# Patient Record
(demographics unavailable — no encounter records)

---

## 2024-10-07 NOTE — PHYSICAL EXAM
[General Appearance - Alert] : alert [General Appearance - In No Acute Distress] : in no acute distress [] : no respiratory distress [Exaggerated Use Of Accessory Muscles For Inspiration] : no accessory muscle use [Heart Rate And Rhythm] : heart rate and rhythm were normal [Arterial Pulses Normal] : the arterial pulses were normal [Abdomen Soft] : soft [Nondistended] : nondistended [Normal] : normal skin color and pigmentation and no rash [No Focal Deficits] : no focal deficits [Oriented To Time, Place, And Person] : oriented to person, place, and time

## 2024-10-07 NOTE — PHYSICAL EXAM
[General Appearance - Alert] : alert [General Appearance - In No Acute Distress] : in no acute distress [] : no respiratory distress [Heart Rate And Rhythm] : heart rate and rhythm were normal [Exaggerated Use Of Accessory Muscles For Inspiration] : no accessory muscle use [Arterial Pulses Normal] : the arterial pulses were normal [Abdomen Soft] : soft [Nondistended] : nondistended [Normal] : normal skin color and pigmentation and no rash [No Focal Deficits] : no focal deficits [Oriented To Time, Place, And Person] : oriented to person, place, and time

## 2024-10-10 NOTE — ADDENDUM
[FreeTextEntry1] : Pt reports dysuria. tried to get UA but pt could not give a sample. He had urinary incontinence instead. Will send script for Cipro presuming UTI

## 2024-10-10 NOTE — ASSESSMENT
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 He started Erbitux on July 5th 2024 He started RT August 2024- 5 frax completed on 8/26/24  Presented with symptomatic hypercalcemia. This had resolved with IV hydration, cancer tx and Zometa will check again today to see if IVF and Zometa need to be reinitiated pt is much more alert and denies any pain.  F/U with rad onc Pt continues on G tube feeds, post placement of PEG on 8/12/24 with Dr. Jung. Pt has been able to take some PO now. Continue working with Speech and Swallow  Cancer related pain: improved and continue meds as needed Other symptoms have resolved PET/CT 3 months after tx- ordered by Dr. Villalpando and to be done mid-November rt parotid swelling- could be post-RT changes. will follow up closely OV in 2 months

## 2024-10-10 NOTE — REVIEW OF SYSTEMS
[Fever] : no fever [Chills] : no chills [Night Sweats] : no night sweats [Fatigue] : fatigue [Recent Change In Weight] : ~T recent weight change [Eye Pain] : no eye pain [Vision Problems] : no vision problems [Dysphagia] : no dysphagia [Loss of Hearing] : no loss of hearing [Nosebleeds] : no nosebleeds [Hoarseness] : no hoarseness [Mucosal Pain] : mucosal pain [Shortness Of Breath] : no shortness of breath [Wheezing] : no wheezing [Cough] : no cough [SOB on Exertion] : shortness of breath during exertion [Abdominal Pain] : no abdominal pain [Vomiting] : no vomiting [Constipation] : constipation [Joint Pain] : no joint pain [Joint Stiffness] : no joint stiffness [Muscle Pain] : no muscle pain [Muscle Weakness] : muscle weakness [Confused] : no confusion [Dizziness] : no dizziness [Fainting] : no fainting [Difficulty Walking] : no difficulty walking [Suicidal] : not suicidal [Depression] : no depression [Negative] : Heme/Lymph [FreeTextEntry4] : thrush resolved, tongue swelling, [FreeTextEntry7] : PEG tube insertion site clean/dressing without leakage

## 2024-10-10 NOTE — HISTORY OF PRESENT ILLNESS
[de-identified] : Mr. Hussein presents today for consideration for systemic tx  Patient noted to have tongue swelling for a few months associated with pain, trouble swallowing, weight loss, left sided otalgia, needle like pain in neck, and mumbled voice starting in November 2023. Pt saw a dentist who was concerned about tongue cancer and was referred to Dr. Rodríguez  Pathology from 6/5/2024 FNA showed: LYMPH NODE, NECK, SUBMENTAL, FNA  Final Diagnosis LYMPH NODE, NECK, SUBMENTAL, FNA NON DIAGNOSTIC.  Cytology slide shows scant blood and few degenerated cells, insufficient for diagnosis.  Cell block is noncontributory.   PET scan 6/15/2024 showed 1. Intensely hypermetabolic lesion involving much of the tongue, consistent with malignant involvement. Bilateral necrotic neck masses and lymph nodes consistent with metastatic lymphadenopathy. 2. RIGHT LOWER lobe pleural-based pulmonary nodule with little to no uptake. Further evaluation suggested to exclude a separate pathology. 3. Physiologic uptake at RIGHT temporalis muscle. 4. Suspected adrenal hyperplasia and/or functioning adenomas. 5. Rest in body above.  Repeat Bx: scheduled for Thursday  Saw rad onc today. Dr. Villalpando  Pt is not surgical candidate due to age. Per Dr. Villalpando, not a candidate for conventional definitive RT he is here for discussion regarding systemic tx.  7/19/24: Patient seen today for follow up prior to his scheduled erbitux accompanied by his daughter. He has completed 2 weeks of erbitux thus far. He was supposed to have started RT however he has not because he needs all of his teeth removed. He plans to have teeth removed next week and then will start RT two weeks after that. He reports nausea after starting treatment, not currently using any antiemetics. He continues to use tramadol for pain. Of note, he has lost 27 pounds since his initial visit. He and his daughter report that his wife purees food for him to drink orally and per the patient he is doing this, however this is questionable per the daughter. Patient does not like to drink ensure. He denies oral pain or difficulty swallowing or decreased appetite. He was also experiencing constipation which was relieved after taking milk or magnesia. Denies fever, chills, SOB, rash.  7/26/24: Patient seen today for follow up accompanied by his daughter and grand daughter and his son joined the visit by telephone. THey report that he has been eating more and is now eating about 3 meals today and is drinking about 2 ensures per day. He had his tooth removed on Wednesday. He denies N/V/D, rash.  8/2/24: Patient seen today for follow up accompanied by his daughter and his son joined the visit via telephone. They report that his tongue has become more swollen over the past week. He has not been eating or drinking as much and as per patient, this is because he did not know that he was supposed to be eating/drinking more than 1 ensure per day. He reports dry mouth at night and when waking up whcih is relieved with water. Has some pain on his tongue from occasionally biting his tongue given how large it is now. He sometimes feels SOB when he first wakes up but this improves after drinking water  8/9/24: Patient seen today for follow up while receiving erbitux, accompanied by his daughter. He started RT yesterday and subsequently experienced increased oral pain. He is planned to have PEG placement with Dr. Jung next week.  8/23/24: Pt seen at the treatment room. His son reports that pt had PEG tube placement on 8/12/24 with Dr. Jung. Thereafter he started tube feeding without significant issues, and he was followed by nutrition team for supplies of tube feeds. He is on home care for management of PEG once/week. Son reports that pt drinks water from mouth but no oral intake for food. He has constipation but controlled with bowel regimen medications. He has improved local pain control around neck region. He is on RT therapy (will finish 5 frax on Monday 8/26/24). The swelling of tongue has been improved with mouth washes. Denies fever/chills, chest pain, SOB at rest, cough, voice changes. He is able to walk a bit more at home comparing to several weeks ago. Admits burning with urination but denies hematuria, increased frequency and flank/abdominal pain.   9/4/24: Patient seen today for follow up accompanied by his son. He completed radiation and erbitux end of august. He is completely PEG dependent at this time and only swallows MMW. He is not speaking much. He reports that his pain is controlled with MMW and tramadol. His constipation improved once he started the tube feeds. Per patients son, they never started the cipro prescribed to them at the last visit because they felt the patients urinary symptoms were due to him being off of flomax. He has since restarted flomax but today reports ongoing urinary frequency and endorses some pain with urination as well. Denies F/C, N/V/D, rash   10/7/24: Pt presents for follow up. He is much better. However, still has tongue swelling which is affecting speech and swallowing. He continues to feed via PEG tube with difficulty in swallowing. He is, however, much more active, and has been engaging in family events and activities.    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

## 2024-10-10 NOTE — PHYSICAL EXAM
[Ambulatory and capable of all self care but unable to carry out any work activities] : Status 2- Ambulatory and capable of all self care but unable to carry out any work activities. Up and about more than 50% of waking hours [Thin] : thin [Ulcers] : no ulcers [Mucositis] : no mucositis [Thrush] : no thrush [Vesicles] : no vesicles [Normal] : grossly intact [de-identified] : MICHAEL  [de-identified] : glossomegaly, no obvious mass lesions,  [de-identified] : PEG tube insertion site clean/dressing without leakage

## 2024-10-10 NOTE — REASON FOR VISIT
[Follow-Up Visit] : a follow-up [Family Member] : family member [FreeTextEntry2] : Head and neck cancer.

## 2024-10-14 NOTE — HISTORY OF PRESENT ILLNESS
[FreeTextEntry1] : Mr. Hussein presents today for consideration for radiation therapy.   Patient noted to have tongue swelling for a months with pain, trouble swallowing, wait loss, left sided otalgia, needle like pain in neck, and mumbled voice starting in november.   Pathology from 6/5/2024 FNA showed: LYMPH NODE, NECK, SUBMENTAL, FNA  Final Diagnosis LYMPH NODE, NECK, SUBMENTAL, FNA NON DIAGNOSTIC.  Cytology slide shows scant blood and few degenerated cells, insufficient for diagnosis.  Cell block is noncontributory.  PET scan 6/15/2024 showed  1.  Intensely hypermetabolic lesion involving much of the tongue, consistent with malignant involvement. Bilateral necrotic neck masses and lymph nodes consistent with metastatic lymphadenopathy. 2.  RIGHT LOWER lobe pleural-based pulmonary nodule with little to no uptake. Further evaluation suggested to exclude a separate pathology. 3.  Physiologic uptake at RIGHT temporalis muscle. 4.  Suspected adrenal hyperplasia and/or functioning adenomas. 5.  Rest in body above.  8/30/2024 Completed SBRT to oral cavity total dose of 4000cGy in 5 fractions.   Presents today for follow up. He continues to feed via PEG tube with difficulty in swallowing.

## 2024-10-14 NOTE — REVIEW OF SYSTEMS
[Dysphagia: Grade 2 - Symptomatic and altered eating/swallowing] : Dysphagia: Grade 2 - Symptomatic and altered eating/swallowing [Fatigue: Grade 1 - Fatigue relieved by rest] : Fatigue: Grade 1 - Fatigue relieved by rest [Mucositis Oral: Grade 0] : Mucositis Oral: Grade 0  [Xerostomia: Grade 1 - Symptomatic (e.g., dry or thick saliva) without significant dietary alteration; unstimulated saliva flow >0.2 ml/min] : Xerostomia: Grade 1 - Symptomatic (e.g., dry or thick saliva) without significant dietary alteration; unstimulated saliva flow >0.2 ml/min [Oral Pain: Grade 0] : Oral Pain: Grade 0 [Skin Hyperpigmentation: Grade 1 - Hyperpigmentation covering <10% BSA; no psychosocial impact] : Skin Hyperpigmentation: Grade 1 - Hyperpigmentation covering <10% BSA; no psychosocial impact [Dermatitis Radiation: Grade 0] : Dermatitis Radiation: Grade 0 [Dysphagia] : dysphagia [Odynophagia] : odynophagia [Negative] : Allergic/Immunologic [Loss of Hearing] : no loss of hearing [Nosebleeds] : no nosebleeds [Hoarseness] : no hoarseness [Mucosal Pain] : no mucosal pain [FreeTextEntry6] : PEG [de-identified] : PEG

## 2024-10-14 NOTE — REVIEW OF SYSTEMS
[Dysphagia: Grade 2 - Symptomatic and altered eating/swallowing] : Dysphagia: Grade 2 - Symptomatic and altered eating/swallowing [Fatigue: Grade 1 - Fatigue relieved by rest] : Fatigue: Grade 1 - Fatigue relieved by rest [Mucositis Oral: Grade 0] : Mucositis Oral: Grade 0  [Xerostomia: Grade 1 - Symptomatic (e.g., dry or thick saliva) without significant dietary alteration; unstimulated saliva flow >0.2 ml/min] : Xerostomia: Grade 1 - Symptomatic (e.g., dry or thick saliva) without significant dietary alteration; unstimulated saliva flow >0.2 ml/min [Oral Pain: Grade 0] : Oral Pain: Grade 0 [Skin Hyperpigmentation: Grade 1 - Hyperpigmentation covering <10% BSA; no psychosocial impact] : Skin Hyperpigmentation: Grade 1 - Hyperpigmentation covering <10% BSA; no psychosocial impact [Dermatitis Radiation: Grade 0] : Dermatitis Radiation: Grade 0 [Dysphagia] : dysphagia [Odynophagia] : odynophagia [Negative] : Allergic/Immunologic [Loss of Hearing] : no loss of hearing [Nosebleeds] : no nosebleeds [Hoarseness] : no hoarseness [Mucosal Pain] : no mucosal pain [FreeTextEntry6] : PEG [de-identified] : PEG

## 2024-10-14 NOTE — REVIEW OF SYSTEMS
[Dysphagia: Grade 2 - Symptomatic and altered eating/swallowing] : Dysphagia: Grade 2 - Symptomatic and altered eating/swallowing [Fatigue: Grade 1 - Fatigue relieved by rest] : Fatigue: Grade 1 - Fatigue relieved by rest [Mucositis Oral: Grade 0] : Mucositis Oral: Grade 0  [Xerostomia: Grade 1 - Symptomatic (e.g., dry or thick saliva) without significant dietary alteration; unstimulated saliva flow >0.2 ml/min] : Xerostomia: Grade 1 - Symptomatic (e.g., dry or thick saliva) without significant dietary alteration; unstimulated saliva flow >0.2 ml/min [Oral Pain: Grade 0] : Oral Pain: Grade 0 [Skin Hyperpigmentation: Grade 1 - Hyperpigmentation covering <10% BSA; no psychosocial impact] : Skin Hyperpigmentation: Grade 1 - Hyperpigmentation covering <10% BSA; no psychosocial impact [Dermatitis Radiation: Grade 0] : Dermatitis Radiation: Grade 0 [Dysphagia] : dysphagia [Odynophagia] : odynophagia [Negative] : Allergic/Immunologic [Loss of Hearing] : no loss of hearing [Nosebleeds] : no nosebleeds [Hoarseness] : no hoarseness [Mucosal Pain] : no mucosal pain [FreeTextEntry6] : PEG [de-identified] : PEG

## 2024-10-14 NOTE — REVIEW OF SYSTEMS
[Dysphagia: Grade 2 - Symptomatic and altered eating/swallowing] : Dysphagia: Grade 2 - Symptomatic and altered eating/swallowing [Fatigue: Grade 1 - Fatigue relieved by rest] : Fatigue: Grade 1 - Fatigue relieved by rest [Mucositis Oral: Grade 0] : Mucositis Oral: Grade 0  [Xerostomia: Grade 1 - Symptomatic (e.g., dry or thick saliva) without significant dietary alteration; unstimulated saliva flow >0.2 ml/min] : Xerostomia: Grade 1 - Symptomatic (e.g., dry or thick saliva) without significant dietary alteration; unstimulated saliva flow >0.2 ml/min [Oral Pain: Grade 0] : Oral Pain: Grade 0 [Skin Hyperpigmentation: Grade 1 - Hyperpigmentation covering <10% BSA; no psychosocial impact] : Skin Hyperpigmentation: Grade 1 - Hyperpigmentation covering <10% BSA; no psychosocial impact [Dermatitis Radiation: Grade 0] : Dermatitis Radiation: Grade 0 [Dysphagia] : dysphagia [Odynophagia] : odynophagia [Negative] : Allergic/Immunologic [Loss of Hearing] : no loss of hearing [Nosebleeds] : no nosebleeds [Hoarseness] : no hoarseness [Mucosal Pain] : no mucosal pain [FreeTextEntry6] : PEG [de-identified] : PEG

## 2024-10-31 NOTE — ADDENDUM
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   - Chief Complaint (CC): Follow-up for squamous cell carcinoma of the left basal tongue after completing chemoradiotherapy.   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 He is under the care of Dr. Smith (medical oncologist) and Dr. Villalpando (radiation oncologist). The patient completed radiation and chemo therapies end of August. He experienced significant weight loss during the treatment, dropping from 210 pounds to around 150 pounds, but his weight has since stabilized. The patient is currently unable to consume solid foods and has a gastrostomy tube in place, which was inserted before the third chemotherapy session. He can tolerate liquids orally but relies on the feeding tube for nutritional support. The patient is currently receiving speech therapy to address swallowing difficulties and is being monitored by the nutritionist.   - Past Medical History:     - Squamous cell carcinoma of the left basal tongue (T4 N2 stage)     - Significant weight loss during cancer treatment     - Gastrostomy tube placement   - Past Surgical History:     - Gastrostomy tube placement    Objective:   - Diagnostic Results: A PET scan has been ordered for November 19th (or November 23rd, as per the patient's recollection) to assess the response to treatment and determine if any residual cancer is present.   - Physical Examination (PE): On physical examination, the patient appears to be in good condition for his age and the extensive treatment he has undergone. No palpable lymph nodes were noted in the neck region. Intraoral examination revealed some edema on the side of the tongue, but no visible tumor mass or radiation mucositis was observed. A small area of candidiasis was noted on the side of the mouth. The tongue felt soft, and no fibrosis was evident.   Assessment and Plan:   - Squamous Cell Carcinoma of the Left Basal Tongue (T4 N2): Based on the clinical examination and the patient's history, it appears that the chemoradiotherapy has been effective in treating the squamous cell carcinoma of the left basal tongue. However, the upcoming PET scan will provide a more definitive assessment of the treatment response and determine if any residual cancer is present.     - Therapeutic Interventions: Continue with speech therapy and nutritional support via the gastrostomy tube. Initiate appropriate antifungal treatment for the oral candidiasis.      - Diagnostic Tests: Proceed with the scheduled PET scan on November 19th to evaluate the treatment response and assess for any residual disease.      - Rx: nystatin mouthwash      - Follow-Up: Schedule a follow-up appointment approximately 6 weeks after the PET scan to review the results, perform a nasopharyngoscopy, and determine the next steps in the patient's care.

## 2024-12-04 NOTE — ADDENDUM
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   - Chief Complaint (CC): Follow-up for squamous cell carcinoma of the left basal tongue after completing chemoradiotherapy. 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 He is under the care of Dr. Smith (medical oncologist) and Dr. Villalpando (radiation oncologist). The patient completed radiation and chemo therapies end of August. He experienced significant weight loss during the treatment, dropping from 210 pounds to around 150 pounds, but his weight has since stabilized. The patient is currently unable to consume solid foods and has a gastrostomy tube in place, which was inserted before the third chemotherapy session. He can tolerate liquids orally but relies on the feeding tube for nutritional support. The patient is currently receiving speech therapy to address swallowing difficulties and is being monitored by the nutritionist.   - Past Medical History:     - Squamous cell carcinoma of the left basal tongue (T4 N2 stage)     - Significant weight loss during cancer treatment     - Gastrostomy tube placement   - Past Surgical History:     - Gastrostomy tube placement   IMPRESSION: Compared to 6/15/2024 FDG PET/CT: 1. Interval significant response to therapy, with interval resolution of hypermetabolic nodes on both sides of the neck, (more conspicuous on prior on the left side). Interval near total resolution of hypermetabolism that involved predominantly the entire tongue. 2. Residual moderate hypermetabolism is noted insinuating between the superior left tongue base and adjacent to left palatine region, air with area of hypermetabolism extending to the level of left hyoid; these potentially may be secondary to radiation or inflammatory changes although residual neoplastic process is not excluded. Consider correlation with direct visual inspection. 3. Decreased FDG hypermetabolism to a unchanged size dependent right lower lobe pleural-based nodule. This remain indeterminate, separate pathology is not entirely excluded. 4. Tiny new hypermetabolic foci in subcutaneous tissues lateral to the left mastoid air cells, indeterminate. Attention on surveillance. 5. Mild hepatic steatosis. 6. Prostamegaly, correlate with PSA levels. 7. Suspected adrenal hyperplasia with interval decreased FDG activity.     Objective:   - Diagnostic Results: A PET scan has been ordered for November 19th (or November 23rd, as per the patient's recollection) to assess the response to treatment and determine if any residual cancer is present.   - Physical Examination (PE): On physical examination, the patient appears to be in good condition for his age and the extensive treatment he has undergone. No palpable lymph nodes were noted in the neck region. Intraoral examination revealed some edema on the side of the tongue, but no visible tumor mass or radiation mucositis was observed. A small area of candidiasis was noted on the side of the mouth. The tongue felt soft, and no fibrosis was evident. Fiberoptic exam, tumor bed with pseudomembrane, bilateral cords mobile, patent air way, no evidence of tumor fungation   Assessment and Plan:   - Squamous Cell Carcinoma of the Left Basal Tongue (T4 N2): Based on the clinical examination and the patient's history, it appears that the chemoradiotherapy has been effective in treating the squamous cell carcinoma of the left basal tongue, PET and FOL suggestive of persistence of disease, the neck disease is stably cystic and tumor volume has substantially decreased. Given recent history of bleeding will order CTA, and review with RAD ONC HEME ONC possible immunotherapy

## 2024-12-06 NOTE — ASSESSMENT
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 He started Erbitux on July 5th 2024 He started RT August 2024- 5 frax completed on 8/26/24   Plan:  -Recent PET-CT shows interval significant response to treatment; Residual moderate hypermetabolism is noted insinuating between the superior left tongue base and adjacent to left palatine region, air with area of hypermetabolism extending to the level of left hyoid; these potentially may be secondary to radiation or inflammatory changes although residual neoplastic process is not excluded. ; Tiny new hypermetabolic foci in subcutaneous tissues lateral to the left mastoid air cells, indeterminate. Attention on surveillance. Dr. Cerda will present his case at  and discuss with Dr. Villalpando and Dr. Rodríguez  -Symptomatic hypercalcemia. S/p zometa. Now receiving Xgeva  -Pt continues on G tube feeds, post placement of PEG on 8/12/24 with Dr. Jung. Pt has been able to take some PO now. Continue working with Speech and Swallow  -Cancer related pain: continue tramadol as needed  OV in 3 weeks

## 2024-12-06 NOTE — REVIEW OF SYSTEMS
[Eye Pain] : no eye pain [Vision Problems] : no vision problems [Dysphagia] : no dysphagia [Loss of Hearing] : no loss of hearing [Nosebleeds] : no nosebleeds [Hoarseness] : no hoarseness [Mucosal Pain] : mucosal pain [Shortness Of Breath] : no shortness of breath [Wheezing] : no wheezing [Cough] : no cough [SOB on Exertion] : shortness of breath during exertion [Abdominal Pain] : no abdominal pain [Vomiting] : no vomiting [Constipation] : constipation [Joint Pain] : no joint pain [Joint Stiffness] : no joint stiffness [Muscle Pain] : no muscle pain [Muscle Weakness] : muscle weakness [Confused] : no confusion [Dizziness] : no dizziness [Fainting] : no fainting [Difficulty Walking] : no difficulty walking [Suicidal] : not suicidal [Depression] : no depression [Negative] : Heme/Lymph [FreeTextEntry4] : see above  [FreeTextEntry7] : PEG tube insertion site clean/dressing without leakage

## 2024-12-06 NOTE — HISTORY OF PRESENT ILLNESS
[de-identified] : Mr. Hussein presents today for consideration for systemic tx  Patient noted to have tongue swelling for a few months associated with pain, trouble swallowing, weight loss, left sided otalgia, needle like pain in neck, and mumbled voice starting in November 2023. Pt saw a dentist who was concerned about tongue cancer and was referred to Dr. Rodríguez  Pathology from 6/5/2024 FNA showed: LYMPH NODE, NECK, SUBMENTAL, FNA  Final Diagnosis LYMPH NODE, NECK, SUBMENTAL, FNA NON DIAGNOSTIC.  Cytology slide shows scant blood and few degenerated cells, insufficient for diagnosis.  Cell block is noncontributory.   PET scan 6/15/2024 showed 1. Intensely hypermetabolic lesion involving much of the tongue, consistent with malignant involvement. Bilateral necrotic neck masses and lymph nodes consistent with metastatic lymphadenopathy. 2. RIGHT LOWER lobe pleural-based pulmonary nodule with little to no uptake. Further evaluation suggested to exclude a separate pathology. 3. Physiologic uptake at RIGHT temporalis muscle. 4. Suspected adrenal hyperplasia and/or functioning adenomas. 5. Rest in body above.  Repeat Bx: scheduled for Thursday  Saw rad onc today. Dr. Villalpando  Pt is not surgical candidate due to age. Per Dr. Villalpando, not a candidate for conventional definitive RT he is here for discussion regarding systemic tx.  7/19/24: Patient seen today for follow up prior to his scheduled erbitux accompanied by his daughter. He has completed 2 weeks of erbitux thus far. He was supposed to have started RT however he has not because he needs all of his teeth removed. He plans to have teeth removed next week and then will start RT two weeks after that. He reports nausea after starting treatment, not currently using any antiemetics. He continues to use tramadol for pain. Of note, he has lost 27 pounds since his initial visit. He and his daughter report that his wife purees food for him to drink orally and per the patient he is doing this, however this is questionable per the daughter. Patient does not like to drink ensure. He denies oral pain or difficulty swallowing or decreased appetite. He was also experiencing constipation which was relieved after taking milk or magnesia. Denies fever, chills, SOB, rash.  7/26/24: Patient seen today for follow up accompanied by his daughter and grand daughter and his son joined the visit by telephone. THey report that he has been eating more and is now eating about 3 meals today and is drinking about 2 ensures per day. He had his tooth removed on Wednesday. He denies N/V/D, rash.  8/2/24: Patient seen today for follow up accompanied by his daughter and his son joined the visit via telephone. They report that his tongue has become more swollen over the past week. He has not been eating or drinking as much and as per patient, this is because he did not know that he was supposed to be eating/drinking more than 1 ensure per day. He reports dry mouth at night and when waking up whcih is relieved with water. Has some pain on his tongue from occasionally biting his tongue given how large it is now. He sometimes feels SOB when he first wakes up but this improves after drinking water  8/9/24: Patient seen today for follow up while receiving erbitux, accompanied by his daughter. He started RT yesterday and subsequently experienced increased oral pain. He is planned to have PEG placement with Dr. Jung next week.  8/23/24: Pt seen at the treatment room. His son reports that pt had PEG tube placement on 8/12/24 with Dr. Jung. Thereafter he started tube feeding without significant issues, and he was followed by nutrition team for supplies of tube feeds. He is on home care for management of PEG once/week. Son reports that pt drinks water from mouth but no oral intake for food. He has constipation but controlled with bowel regimen medications. He has improved local pain control around neck region. He is on RT therapy (will finish 5 frax on Monday 8/26/24). The swelling of tongue has been improved with mouth washes. Denies fever/chills, chest pain, SOB at rest, cough, voice changes. He is able to walk a bit more at home comparing to several weeks ago. Admits burning with urination but denies hematuria, increased frequency and flank/abdominal pain.   9/4/24: Patient seen today for follow up accompanied by his son. He completed radiation and erbitux end of august. He is completely PEG dependent at this time and only swallows MMW. He is not speaking much. He reports that his pain is controlled with MMW and tramadol. His constipation improved once he started the tube feeds. Per patients son, they never started the cipro prescribed to them at the last visit because they felt the patients urinary symptoms were due to him being off of flomax. He has since restarted flomax but today reports ongoing urinary frequency and endorses some pain with urination as well. Denies F/C, N/V/D, rash  10/7/24: Pt presents for follow up. He is much better. However, still has tongue swelling which is affecting speech and swallowing. He continues to feed via PEG tube with difficulty in swallowing. He is, however, much more active, and has been engaging in family events and activities.   12/6/24: Patient seen today for follow up accompanied by his son. His daughter Shirley joined the call via telephone. They report that the patient initially was feeling well, was speaking a lot however over the last few days his has been speaking less, has had more secretions, has had some more left jaw pain, and they feel under his neck is becoming more swollen. They stated that they recently saw Dr. Rodríguez who did an exam and felt there might be more tumor. They also report that the patient had an episode of bleeding from his mouth and nose and they told Dr. Rodríguez who ordered a CT of his neck which is still pending.     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

## 2024-12-06 NOTE — PHYSICAL EXAM
[Ambulatory and capable of all self care but unable to carry out any work activities] : Status 2- Ambulatory and capable of all self care but unable to carry out any work activities. Up and about more than 50% of waking hours [Thin] : thin [Ulcers] : no ulcers [Mucositis] : no mucositis [Thrush] : no thrush [Vesicles] : no vesicles [Normal] : grossly intact [de-identified] : MICHAEL  [de-identified] : glossomegaly, no obvious mass lesions,  [de-identified] : PEG tube insertion site clean/dressing without leakage

## 2024-12-17 NOTE — ASSESSMENT
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 He started Erbitux on July 5th 2024 He started RT August 2024- 5 frax completed on 8/26/24  Plan:  -Recent PET-CT shows interval significant response to treatment; Residual moderate hypermetabolism is noted insinuating between the superior left tongue base and adjacent to left palatine region, air with area of hypermetabolism extending to the level of left hyoid; these potentially may be secondary to radiation or inflammatory changes although residual neoplastic process is not excluded. ; Tiny new hypermetabolic foci in subcutaneous tissues lateral to the left mastoid air cells, indeterminate. Attention on surveillance. Presented his case at  and discussed with Dr. Villalpando and Dr. Rodríguez  Discussed bx and tx if cancer vs. tx without tx Since concern for cancer is high based on scans, bx is risky, and high likelihood of false negative, we decided to proceed with tx without bx.  Pt cannot tolerate chemo. recommended Pembro.  We discussed the regimen in detail including potential benefits and AEs in detail. We answered all questions. we went over schedule and other pertinent details. Pt provided verbal informed consent. Continue working with Speech and Swallow  Cancer related pain: continue tramadol as needed.  OV in 3 weeks

## 2024-12-17 NOTE — HISTORY OF PRESENT ILLNESS
[de-identified] : Mr. Hussein presents today for consideration for systemic tx  Patient noted to have tongue swelling for a few months associated with pain, trouble swallowing, weight loss, left sided otalgia, needle like pain in neck, and mumbled voice starting in November 2023. Pt saw a dentist who was concerned about tongue cancer and was referred to Dr. Rodríguez  Pathology from 6/5/2024 FNA showed: LYMPH NODE, NECK, SUBMENTAL, FNA  Final Diagnosis LYMPH NODE, NECK, SUBMENTAL, FNA NON DIAGNOSTIC.  Cytology slide shows scant blood and few degenerated cells, insufficient for diagnosis.  Cell block is noncontributory.   PET scan 6/15/2024 showed 1. Intensely hypermetabolic lesion involving much of the tongue, consistent with malignant involvement. Bilateral necrotic neck masses and lymph nodes consistent with metastatic lymphadenopathy. 2. RIGHT LOWER lobe pleural-based pulmonary nodule with little to no uptake. Further evaluation suggested to exclude a separate pathology. 3. Physiologic uptake at RIGHT temporalis muscle. 4. Suspected adrenal hyperplasia and/or functioning adenomas. 5. Rest in body above.  Repeat Bx: scheduled for Thursday  Saw rad onc today. Dr. Villalpando  Pt is not surgical candidate due to age. Per Dr. Villalpando, not a candidate for conventional definitive RT he is here for discussion regarding systemic tx.  7/19/24: Patient seen today for follow up prior to his scheduled erbitux accompanied by his daughter. He has completed 2 weeks of erbitux thus far. He was supposed to have started RT however he has not because he needs all of his teeth removed. He plans to have teeth removed next week and then will start RT two weeks after that. He reports nausea after starting treatment, not currently using any antiemetics. He continues to use tramadol for pain. Of note, he has lost 27 pounds since his initial visit. He and his daughter report that his wife purees food for him to drink orally and per the patient he is doing this, however this is questionable per the daughter. Patient does not like to drink ensure. He denies oral pain or difficulty swallowing or decreased appetite. He was also experiencing constipation which was relieved after taking milk or magnesia. Denies fever, chills, SOB, rash.  7/26/24: Patient seen today for follow up accompanied by his daughter and grand daughter and his son joined the visit by telephone. THey report that he has been eating more and is now eating about 3 meals today and is drinking about 2 ensures per day. He had his tooth removed on Wednesday. He denies N/V/D, rash.  8/2/24: Patient seen today for follow up accompanied by his daughter and his son joined the visit via telephone. They report that his tongue has become more swollen over the past week. He has not been eating or drinking as much and as per patient, this is because he did not know that he was supposed to be eating/drinking more than 1 ensure per day. He reports dry mouth at night and when waking up whcih is relieved with water. Has some pain on his tongue from occasionally biting his tongue given how large it is now. He sometimes feels SOB when he first wakes up but this improves after drinking water  8/9/24: Patient seen today for follow up while receiving erbitux, accompanied by his daughter. He started RT yesterday and subsequently experienced increased oral pain. He is planned to have PEG placement with Dr. Jung next week.  8/23/24: Pt seen at the treatment room. His son reports that pt had PEG tube placement on 8/12/24 with Dr. Jugn. Thereafter he started tube feeding without significant issues, and he was followed by nutrition team for supplies of tube feeds. He is on home care for management of PEG once/week. Son reports that pt drinks water from mouth but no oral intake for food. He has constipation but controlled with bowel regimen medications. He has improved local pain control around neck region. He is on RT therapy (will finish 5 frax on Monday 8/26/24). The swelling of tongue has been improved with mouth washes. Denies fever/chills, chest pain, SOB at rest, cough, voice changes. He is able to walk a bit more at home comparing to several weeks ago. Admits burning with urination but denies hematuria, increased frequency and flank/abdominal pain.   9/4/24: Patient seen today for follow up accompanied by his son. He completed radiation and erbitux end of august. He is completely PEG dependent at this time and only swallows MMW. He is not speaking much. He reports that his pain is controlled with MMW and tramadol. His constipation improved once he started the tube feeds. Per patients son, they never started the cipro prescribed to them at the last visit because they felt the patients urinary symptoms were due to him being off of flomax. He has since restarted flomax but today reports ongoing urinary frequency and endorses some pain with urination as well. Denies F/C, N/V/D, rash  10/7/24: Pt presents for follow up. He is much better. However, still has tongue swelling which is affecting speech and swallowing. He continues to feed via PEG tube with difficulty in swallowing. He is, however, much more active, and has been engaging in family events and activities.   12/6/24: Patient seen today for follow up accompanied by his son. His daughter Shirley joined the call via telephone. They report that the patient initially was feeling well, was speaking a lot however over the last few days his has been speaking less, has had more secretions, has had some more left jaw pain, and they feel under his neck is becoming more swollen. They stated that they recently saw Dr. Rodríguez who did an exam and felt there might be more tumor. They also report that the patient had an episode of bleeding from his mouth and nose and they told Dr. Rodríguez who ordered a CT of his neck which is still pending.   12/17/24: here for follow up. Accompanied by his son on this TEB visit. Pt has pain, speech impediment, weight loss, and decreased ability to eat. He was seen by Marcos Carpenter who suggested bx under sedation vs. tx without bx. The concern for recurrence is very high.  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

## 2024-12-17 NOTE — REASON FOR VISIT
[Home] : at home, [unfilled] , at the time of the visit. [Medical Office: ([facility])___] : at the medical office located in  [Patient] : the patient [Self] : self [Follow-Up Visit] : a follow-up [Family Member] : family member [FreeTextEntry2] : Head and neck cancer.

## 2024-12-17 NOTE — PHYSICAL EXAM
[Ambulatory and capable of all self care but unable to carry out any work activities] : Status 2- Ambulatory and capable of all self care but unable to carry out any work activities. Up and about more than 50% of waking hours [Thin] : thin [Normal] : grossly intact [Ulcers] : no ulcers [Mucositis] : no mucositis [Thrush] : no thrush [Vesicles] : no vesicles [de-identified] : MICHAEL  [de-identified] : glossomegaly, no obvious mass lesions,  [de-identified] : PEG tube insertion site clean/dressing without leakage

## 2024-12-17 NOTE — REVIEW OF SYSTEMS
[Mucosal Pain] : mucosal pain [SOB on Exertion] : shortness of breath during exertion [Constipation] : constipation [Muscle Weakness] : muscle weakness [Negative] : Heme/Lymph [Eye Pain] : no eye pain [Vision Problems] : no vision problems [Dysphagia] : no dysphagia [Loss of Hearing] : no loss of hearing [Nosebleeds] : no nosebleeds [Hoarseness] : no hoarseness [Shortness Of Breath] : no shortness of breath [Wheezing] : no wheezing [Cough] : no cough [Abdominal Pain] : no abdominal pain [Vomiting] : no vomiting [Joint Pain] : no joint pain [Joint Stiffness] : no joint stiffness [Muscle Pain] : no muscle pain [Confused] : no confusion [Dizziness] : no dizziness [Fainting] : no fainting [Difficulty Walking] : no difficulty walking [Suicidal] : not suicidal [Depression] : no depression [FreeTextEntry4] : see above  [FreeTextEntry7] : PEG tube insertion site clean/dressing without leakage

## 2025-01-03 NOTE — BEGINNING OF VISIT
[1] : 2) Feeling down, depressed, or hopeless for several days (1) [PHQ-2 Positive] : PHQ-2 Positive [Referred to Palliative/Pain Management] : Referred to Palliative/Pain Management [Never] : Never [Patient/Caregiver not ready to engage] : Patient/Caregiver not ready to engage [PHQ-9 Negative] : PHQ-9 Negative [Provided Coping Management Support] : Provided Coping Management Support

## 2025-01-03 NOTE — PHYSICAL EXAM
[Ambulatory and capable of all self care but unable to carry out any work activities] : Status 2- Ambulatory and capable of all self care but unable to carry out any work activities. Up and about more than 50% of waking hours [Thin] : thin [Normal] : grossly intact [Ulcers] : no ulcers [Mucositis] : no mucositis [Thrush] : no thrush [Vesicles] : no vesicles [de-identified] : MICHAEL  [de-identified] : glossomegaly [de-identified] : PEG tube insertion site clean/dressing without leakage

## 2025-01-03 NOTE — HISTORY OF PRESENT ILLNESS
[de-identified] : Mr. Hussein presents today for consideration for systemic tx  Patient noted to have tongue swelling for a few months associated with pain, trouble swallowing, weight loss, left sided otalgia, needle like pain in neck, and mumbled voice starting in November 2023. Pt saw a dentist who was concerned about tongue cancer and was referred to Dr. Rodríguez  Pathology from 6/5/2024 FNA showed: LYMPH NODE, NECK, SUBMENTAL, FNA  Final Diagnosis LYMPH NODE, NECK, SUBMENTAL, FNA NON DIAGNOSTIC.  Cytology slide shows scant blood and few degenerated cells, insufficient for diagnosis.  Cell block is noncontributory.   PET scan 6/15/2024 showed 1. Intensely hypermetabolic lesion involving much of the tongue, consistent with malignant involvement. Bilateral necrotic neck masses and lymph nodes consistent with metastatic lymphadenopathy. 2. RIGHT LOWER lobe pleural-based pulmonary nodule with little to no uptake. Further evaluation suggested to exclude a separate pathology. 3. Physiologic uptake at RIGHT temporalis muscle. 4. Suspected adrenal hyperplasia and/or functioning adenomas. 5. Rest in body above.  Repeat Bx: scheduled for Thursday  Saw rad onc today. Dr. Villalpando  Pt is not surgical candidate due to age. Per Dr. Villalpando, not a candidate for conventional definitive RT he is here for discussion regarding systemic tx.  7/19/24: Patient seen today for follow up prior to his scheduled erbitux accompanied by his daughter. He has completed 2 weeks of erbitux thus far. He was supposed to have started RT however he has not because he needs all of his teeth removed. He plans to have teeth removed next week and then will start RT two weeks after that. He reports nausea after starting treatment, not currently using any antiemetics. He continues to use tramadol for pain. Of note, he has lost 27 pounds since his initial visit. He and his daughter report that his wife purees food for him to drink orally and per the patient he is doing this, however this is questionable per the daughter. Patient does not like to drink ensure. He denies oral pain or difficulty swallowing or decreased appetite. He was also experiencing constipation which was relieved after taking milk or magnesia. Denies fever, chills, SOB, rash.  7/26/24: Patient seen today for follow up accompanied by his daughter and grand daughter and his son joined the visit by telephone. THey report that he has been eating more and is now eating about 3 meals today and is drinking about 2 ensures per day. He had his tooth removed on Wednesday. He denies N/V/D, rash.  8/2/24: Patient seen today for follow up accompanied by his daughter and his son joined the visit via telephone. They report that his tongue has become more swollen over the past week. He has not been eating or drinking as much and as per patient, this is because he did not know that he was supposed to be eating/drinking more than 1 ensure per day. He reports dry mouth at night and when waking up whcih is relieved with water. Has some pain on his tongue from occasionally biting his tongue given how large it is now. He sometimes feels SOB when he first wakes up but this improves after drinking water  8/9/24: Patient seen today for follow up while receiving erbitux, accompanied by his daughter. He started RT yesterday and subsequently experienced increased oral pain. He is planned to have PEG placement with Dr. Jung next week.  8/23/24: Pt seen at the treatment room. His son reports that pt had PEG tube placement on 8/12/24 with Dr. Jung. Thereafter he started tube feeding without significant issues, and he was followed by nutrition team for supplies of tube feeds. He is on home care for management of PEG once/week. Son reports that pt drinks water from mouth but no oral intake for food. He has constipation but controlled with bowel regimen medications. He has improved local pain control around neck region. He is on RT therapy (will finish 5 frax on Monday 8/26/24). The swelling of tongue has been improved with mouth washes. Denies fever/chills, chest pain, SOB at rest, cough, voice changes. He is able to walk a bit more at home comparing to several weeks ago. Admits burning with urination but denies hematuria, increased frequency and flank/abdominal pain.   9/4/24: Patient seen today for follow up accompanied by his son. He completed radiation and erbitux end of august. He is completely PEG dependent at this time and only swallows MMW. He is not speaking much. He reports that his pain is controlled with MMW and tramadol. His constipation improved once he started the tube feeds. Per patients son, they never started the cipro prescribed to them at the last visit because they felt the patients urinary symptoms were due to him being off of flomax. He has since restarted flomax but today reports ongoing urinary frequency and endorses some pain with urination as well. Denies F/C, N/V/D, rash  10/7/24: Pt presents for follow up. He is much better. However, still has tongue swelling which is affecting speech and swallowing. He continues to feed via PEG tube with difficulty in swallowing. He is, however, much more active, and has been engaging in family events and activities.   12/6/24: Patient seen today for follow up accompanied by his son. His daughter Shirley joined the call via telephone. They report that the patient initially was feeling well, was speaking a lot however over the last few days his has been speaking less, has had more secretions, has had some more left jaw pain, and they feel under his neck is becoming more swollen. They stated that they recently saw Dr. Rodríguez who did an exam and felt there might be more tumor. They also report that the patient had an episode of bleeding from his mouth and nose and they told Dr. Rodríguez who ordered a CT of his neck which is still pending.   12/17/24: here for follow up. Accompanied by his son on this TEB visit. Pt has pain, speech impediment, weight loss, and decreased ability to eat. He was seen by Marcos Carpenter who suggested bx under sedation vs. tx without bx. The concern for recurrence is very high.  1/3/25: Patient seen today for follow up prior to starting keytruda. He is accompanied by his son and his daughter joined the visit via telephone. They report that the patient has not been doing speech and swallow exercises at home and recommended by SLP and he has only been doing one tube feed per day whereas he is supposed to be doing 3. The patient reports that this is because he doesn't do his first feed until 1pm and then his wife isnt home. We discussed the importance of adhering to recommendations for best outcomes. His family also reports increased pain and trouble swallowing tramadol. They also report two incidences where the patient lost his balance while walking and fell. They also feel that he has been sleeping more, staying in bed and not moving as much.     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

## 2025-01-03 NOTE — REVIEW OF SYSTEMS
[Mucosal Pain] : mucosal pain [SOB on Exertion] : shortness of breath during exertion [Constipation] : constipation [Muscle Weakness] : muscle weakness [Negative] : Heme/Lymph [Fatigue] : fatigue [Recent Change In Weight] : ~T recent weight change [Eye Pain] : no eye pain [Vision Problems] : no vision problems [Dysphagia] : no dysphagia [Loss of Hearing] : no loss of hearing [Nosebleeds] : no nosebleeds [Hoarseness] : no hoarseness [Shortness Of Breath] : no shortness of breath [Wheezing] : no wheezing [Cough] : no cough [Abdominal Pain] : no abdominal pain [Vomiting] : no vomiting [Joint Pain] : no joint pain [Joint Stiffness] : no joint stiffness [Muscle Pain] : no muscle pain [Confused] : no confusion [Dizziness] : no dizziness [Fainting] : no fainting [Difficulty Walking] : no difficulty walking [Suicidal] : not suicidal [Depression] : no depression [FreeTextEntry4] : see above  [FreeTextEntry7] : PEG tube insertion site clean/dressing without leakage

## 2025-01-03 NOTE — HISTORY OF PRESENT ILLNESS
[de-identified] : Mr. Hussein presents today for consideration for systemic tx  Patient noted to have tongue swelling for a few months associated with pain, trouble swallowing, weight loss, left sided otalgia, needle like pain in neck, and mumbled voice starting in November 2023. Pt saw a dentist who was concerned about tongue cancer and was referred to Dr. Rodríguez  Pathology from 6/5/2024 FNA showed: LYMPH NODE, NECK, SUBMENTAL, FNA  Final Diagnosis LYMPH NODE, NECK, SUBMENTAL, FNA NON DIAGNOSTIC.  Cytology slide shows scant blood and few degenerated cells, insufficient for diagnosis.  Cell block is noncontributory.   PET scan 6/15/2024 showed 1. Intensely hypermetabolic lesion involving much of the tongue, consistent with malignant involvement. Bilateral necrotic neck masses and lymph nodes consistent with metastatic lymphadenopathy. 2. RIGHT LOWER lobe pleural-based pulmonary nodule with little to no uptake. Further evaluation suggested to exclude a separate pathology. 3. Physiologic uptake at RIGHT temporalis muscle. 4. Suspected adrenal hyperplasia and/or functioning adenomas. 5. Rest in body above.  Repeat Bx: scheduled for Thursday  Saw rad onc today. Dr. Villalpando  Pt is not surgical candidate due to age. Per Dr. Villalpando, not a candidate for conventional definitive RT he is here for discussion regarding systemic tx.  7/19/24: Patient seen today for follow up prior to his scheduled erbitux accompanied by his daughter. He has completed 2 weeks of erbitux thus far. He was supposed to have started RT however he has not because he needs all of his teeth removed. He plans to have teeth removed next week and then will start RT two weeks after that. He reports nausea after starting treatment, not currently using any antiemetics. He continues to use tramadol for pain. Of note, he has lost 27 pounds since his initial visit. He and his daughter report that his wife purees food for him to drink orally and per the patient he is doing this, however this is questionable per the daughter. Patient does not like to drink ensure. He denies oral pain or difficulty swallowing or decreased appetite. He was also experiencing constipation which was relieved after taking milk or magnesia. Denies fever, chills, SOB, rash.  7/26/24: Patient seen today for follow up accompanied by his daughter and grand daughter and his son joined the visit by telephone. THey report that he has been eating more and is now eating about 3 meals today and is drinking about 2 ensures per day. He had his tooth removed on Wednesday. He denies N/V/D, rash.  8/2/24: Patient seen today for follow up accompanied by his daughter and his son joined the visit via telephone. They report that his tongue has become more swollen over the past week. He has not been eating or drinking as much and as per patient, this is because he did not know that he was supposed to be eating/drinking more than 1 ensure per day. He reports dry mouth at night and when waking up whcih is relieved with water. Has some pain on his tongue from occasionally biting his tongue given how large it is now. He sometimes feels SOB when he first wakes up but this improves after drinking water  8/9/24: Patient seen today for follow up while receiving erbitux, accompanied by his daughter. He started RT yesterday and subsequently experienced increased oral pain. He is planned to have PEG placement with Dr. Jung next week.  8/23/24: Pt seen at the treatment room. His son reports that pt had PEG tube placement on 8/12/24 with Dr. Jung. Thereafter he started tube feeding without significant issues, and he was followed by nutrition team for supplies of tube feeds. He is on home care for management of PEG once/week. Son reports that pt drinks water from mouth but no oral intake for food. He has constipation but controlled with bowel regimen medications. He has improved local pain control around neck region. He is on RT therapy (will finish 5 frax on Monday 8/26/24). The swelling of tongue has been improved with mouth washes. Denies fever/chills, chest pain, SOB at rest, cough, voice changes. He is able to walk a bit more at home comparing to several weeks ago. Admits burning with urination but denies hematuria, increased frequency and flank/abdominal pain.   9/4/24: Patient seen today for follow up accompanied by his son. He completed radiation and erbitux end of august. He is completely PEG dependent at this time and only swallows MMW. He is not speaking much. He reports that his pain is controlled with MMW and tramadol. His constipation improved once he started the tube feeds. Per patients son, they never started the cipro prescribed to them at the last visit because they felt the patients urinary symptoms were due to him being off of flomax. He has since restarted flomax but today reports ongoing urinary frequency and endorses some pain with urination as well. Denies F/C, N/V/D, rash  10/7/24: Pt presents for follow up. He is much better. However, still has tongue swelling which is affecting speech and swallowing. He continues to feed via PEG tube with difficulty in swallowing. He is, however, much more active, and has been engaging in family events and activities.   12/6/24: Patient seen today for follow up accompanied by his son. His daughter Shirley joined the call via telephone. They report that the patient initially was feeling well, was speaking a lot however over the last few days his has been speaking less, has had more secretions, has had some more left jaw pain, and they feel under his neck is becoming more swollen. They stated that they recently saw Dr. Rodríguez who did an exam and felt there might be more tumor. They also report that the patient had an episode of bleeding from his mouth and nose and they told Dr. Rodríguez who ordered a CT of his neck which is still pending.   12/17/24: here for follow up. Accompanied by his son on this TEB visit. Pt has pain, speech impediment, weight loss, and decreased ability to eat. He was seen by Marcos Carpenter who suggested bx under sedation vs. tx without bx. The concern for recurrence is very high.  1/3/25: Patient seen today for follow up prior to starting keytruda. He is accompanied by his son and his daughter joined the visit via telephone. They report that the patient has not been doing speech and swallow exercises at home and recommended by SLP and he has only been doing one tube feed per day whereas he is supposed to be doing 3. The patient reports that this is because he doesn't do his first feed until 1pm and then his wife isnt home. We discussed the importance of adhering to recommendations for best outcomes. His family also reports increased pain and trouble swallowing tramadol. They also report two incidences where the patient lost his balance while walking and fell. They also feel that he has been sleeping more, staying in bed and not moving as much.     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

## 2025-01-03 NOTE — ASSESSMENT
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 He started Erbitux on July 5th 2024 He started RT August 2024- 5 frax completed on 8/26/24  Plan:  -Recent PET-CT shows interval significant response to treatment; Residual moderate hypermetabolism is noted insinuating between the superior left tongue base and adjacent to left palatine region, air with area of hypermetabolism extending to the level of left hyoid; these potentially may be secondary to radiation or inflammatory changes although residual neoplastic process is not excluded. ; Tiny new hypermetabolic foci in subcutaneous tissues lateral to the left mastoid air cells, indeterminate. Attention on surveillance. Presented his case at  and discussed with Dr. Villalpando and Dr. Rodríguez  Discussed bx and tx if cancer vs. tx without tx Since concern for cancer is high based on scans, bx is risky, and high likelihood of false negative, we decided to proceed with tx without bx.  Pt cannot tolerate chemo. recommended Pembro.   Plan: -Proceed with C1 keytruda today 1/3/25 (written consent obtained today)  -Continue working with Speech and Swallow  -Cancer related pain: Patient has been using tramadol PRN however has been having difficulty swallowing. Will refer to [facility] care for pain/global symptom management  -Recent falls: Will obtain MR Brain to rule out intracranial mets  -Hypercalcemia: Received Zometa previously. Now has dental clearance for Xgeva. Continue to monitor Ca OV in 3 weeks

## 2025-01-03 NOTE — ASSESSMENT
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 He started Erbitux on July 5th 2024 He started RT August 2024- 5 frax completed on 8/26/24  Plan:  -Recent PET-CT shows interval significant response to treatment; Residual moderate hypermetabolism is noted insinuating between the superior left tongue base and adjacent to left palatine region, air with area of hypermetabolism extending to the level of left hyoid; these potentially may be secondary to radiation or inflammatory changes although residual neoplastic process is not excluded. ; Tiny new hypermetabolic foci in subcutaneous tissues lateral to the left mastoid air cells, indeterminate. Attention on surveillance. Presented his case at  and discussed with Dr. Villalpando and Dr. Rodríguez  Discussed bx and tx if cancer vs. tx without tx Since concern for cancer is high based on scans, bx is risky, and high likelihood of false negative, we decided to proceed with tx without bx.  Pt cannot tolerate chemo. recommended Pembro.   Plan: -Proceed with C1 keytruda today 1/3/25 (written consent obtained today)  -Continue working with Speech and Swallow  -Cancer related pain: Patient has been using tramadol PRN however has been having difficulty swallowing. Will refer to [facility] care for pain/global symptom management  -Recent falls: Will obtain MR Brain to rule out intracranial mets  -Hypercalcemia: Received Zometa previously. Now has dental clearance for Xgeva. Continue to monitor Ca OV in 3 weeks

## 2025-01-03 NOTE — PHYSICAL EXAM
[Ambulatory and capable of all self care but unable to carry out any work activities] : Status 2- Ambulatory and capable of all self care but unable to carry out any work activities. Up and about more than 50% of waking hours [Thin] : thin [Normal] : grossly intact [Ulcers] : no ulcers [Mucositis] : no mucositis [Thrush] : no thrush [Vesicles] : no vesicles [de-identified] : MICHAEL  [de-identified] : glossomegaly [de-identified] : PEG tube insertion site clean/dressing without leakage

## 2025-01-03 NOTE — ASSESSMENT
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 He started Erbitux on July 5th 2024 He started RT August 2024- 5 frax completed on 8/26/24  Plan:  -Recent PET-CT shows interval significant response to treatment; Residual moderate hypermetabolism is noted insinuating between the superior left tongue base and adjacent to left palatine region, air with area of hypermetabolism extending to the level of left hyoid; these potentially may be secondary to radiation or inflammatory changes although residual neoplastic process is not excluded. ; Tiny new hypermetabolic foci in subcutaneous tissues lateral to the left mastoid air cells, indeterminate. Attention on surveillance. Presented his case at  and discussed with Dr. Villalpando and Dr. Rodríguez  Discussed bx and tx if cancer vs. tx without tx Since concern for cancer is high based on scans, bx is risky, and high likelihood of false negative, we decided to proceed with tx without bx.  Pt cannot tolerate chemo. recommended Pembro.   Plan: -Proceed with C1 keytruda today 1/3/25 (written consent obtained today)  -Continue working with Speech and Swallow  -Cancer related pain: Patient has been using tramadol PRN however has been having difficulty swallowing. Will refer to [facility] care for pain/global symptom management  -Recent falls: Will obtain MR Brain to rule out intracranial mets  -Hypercalcemia: Received Zometa previously. Now has dental clearance for Xgeva. Continue to monitor Ca OV in 3 weeks

## 2025-01-03 NOTE — HISTORY OF PRESENT ILLNESS
[de-identified] : Mr. Hussein presents today for consideration for systemic tx  Patient noted to have tongue swelling for a few months associated with pain, trouble swallowing, weight loss, left sided otalgia, needle like pain in neck, and mumbled voice starting in November 2023. Pt saw a dentist who was concerned about tongue cancer and was referred to Dr. Rodríguez  Pathology from 6/5/2024 FNA showed: LYMPH NODE, NECK, SUBMENTAL, FNA  Final Diagnosis LYMPH NODE, NECK, SUBMENTAL, FNA NON DIAGNOSTIC.  Cytology slide shows scant blood and few degenerated cells, insufficient for diagnosis.  Cell block is noncontributory.   PET scan 6/15/2024 showed 1. Intensely hypermetabolic lesion involving much of the tongue, consistent with malignant involvement. Bilateral necrotic neck masses and lymph nodes consistent with metastatic lymphadenopathy. 2. RIGHT LOWER lobe pleural-based pulmonary nodule with little to no uptake. Further evaluation suggested to exclude a separate pathology. 3. Physiologic uptake at RIGHT temporalis muscle. 4. Suspected adrenal hyperplasia and/or functioning adenomas. 5. Rest in body above.  Repeat Bx: scheduled for Thursday  Saw rad onc today. Dr. Villalpando  Pt is not surgical candidate due to age. Per Dr. Villalpando, not a candidate for conventional definitive RT he is here for discussion regarding systemic tx.  7/19/24: Patient seen today for follow up prior to his scheduled erbitux accompanied by his daughter. He has completed 2 weeks of erbitux thus far. He was supposed to have started RT however he has not because he needs all of his teeth removed. He plans to have teeth removed next week and then will start RT two weeks after that. He reports nausea after starting treatment, not currently using any antiemetics. He continues to use tramadol for pain. Of note, he has lost 27 pounds since his initial visit. He and his daughter report that his wife purees food for him to drink orally and per the patient he is doing this, however this is questionable per the daughter. Patient does not like to drink ensure. He denies oral pain or difficulty swallowing or decreased appetite. He was also experiencing constipation which was relieved after taking milk or magnesia. Denies fever, chills, SOB, rash.  7/26/24: Patient seen today for follow up accompanied by his daughter and grand daughter and his son joined the visit by telephone. THey report that he has been eating more and is now eating about 3 meals today and is drinking about 2 ensures per day. He had his tooth removed on Wednesday. He denies N/V/D, rash.  8/2/24: Patient seen today for follow up accompanied by his daughter and his son joined the visit via telephone. They report that his tongue has become more swollen over the past week. He has not been eating or drinking as much and as per patient, this is because he did not know that he was supposed to be eating/drinking more than 1 ensure per day. He reports dry mouth at night and when waking up whcih is relieved with water. Has some pain on his tongue from occasionally biting his tongue given how large it is now. He sometimes feels SOB when he first wakes up but this improves after drinking water  8/9/24: Patient seen today for follow up while receiving erbitux, accompanied by his daughter. He started RT yesterday and subsequently experienced increased oral pain. He is planned to have PEG placement with Dr. Jung next week.  8/23/24: Pt seen at the treatment room. His son reports that pt had PEG tube placement on 8/12/24 with Dr. Jung. Thereafter he started tube feeding without significant issues, and he was followed by nutrition team for supplies of tube feeds. He is on home care for management of PEG once/week. Son reports that pt drinks water from mouth but no oral intake for food. He has constipation but controlled with bowel regimen medications. He has improved local pain control around neck region. He is on RT therapy (will finish 5 frax on Monday 8/26/24). The swelling of tongue has been improved with mouth washes. Denies fever/chills, chest pain, SOB at rest, cough, voice changes. He is able to walk a bit more at home comparing to several weeks ago. Admits burning with urination but denies hematuria, increased frequency and flank/abdominal pain.   9/4/24: Patient seen today for follow up accompanied by his son. He completed radiation and erbitux end of august. He is completely PEG dependent at this time and only swallows MMW. He is not speaking much. He reports that his pain is controlled with MMW and tramadol. His constipation improved once he started the tube feeds. Per patients son, they never started the cipro prescribed to them at the last visit because they felt the patients urinary symptoms were due to him being off of flomax. He has since restarted flomax but today reports ongoing urinary frequency and endorses some pain with urination as well. Denies F/C, N/V/D, rash  10/7/24: Pt presents for follow up. He is much better. However, still has tongue swelling which is affecting speech and swallowing. He continues to feed via PEG tube with difficulty in swallowing. He is, however, much more active, and has been engaging in family events and activities.   12/6/24: Patient seen today for follow up accompanied by his son. His daughter Shirley joined the call via telephone. They report that the patient initially was feeling well, was speaking a lot however over the last few days his has been speaking less, has had more secretions, has had some more left jaw pain, and they feel under his neck is becoming more swollen. They stated that they recently saw Dr. Rodríguez who did an exam and felt there might be more tumor. They also report that the patient had an episode of bleeding from his mouth and nose and they told Dr. Rodríguez who ordered a CT of his neck which is still pending.   12/17/24: here for follow up. Accompanied by his son on this TEB visit. Pt has pain, speech impediment, weight loss, and decreased ability to eat. He was seen by Marcos Carpenter who suggested bx under sedation vs. tx without bx. The concern for recurrence is very high.  1/3/25: Patient seen today for follow up prior to starting keytruda. He is accompanied by his son and his daughter joined the visit via telephone. They report that the patient has not been doing speech and swallow exercises at home and recommended by SLP and he has only been doing one tube feed per day whereas he is supposed to be doing 3. The patient reports that this is because he doesn't do his first feed until 1pm and then his wife isnt home. We discussed the importance of adhering to recommendations for best outcomes. His family also reports increased pain and trouble swallowing tramadol. They also report two incidences where the patient lost his balance while walking and fell. They also feel that he has been sleeping more, staying in bed and not moving as much.     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

## 2025-01-03 NOTE — PHYSICAL EXAM
[Ambulatory and capable of all self care but unable to carry out any work activities] : Status 2- Ambulatory and capable of all self care but unable to carry out any work activities. Up and about more than 50% of waking hours [Thin] : thin [Normal] : grossly intact [Ulcers] : no ulcers [Mucositis] : no mucositis [Thrush] : no thrush [Vesicles] : no vesicles [de-identified] : MICHAEL  [de-identified] : glossomegaly [de-identified] : PEG tube insertion site clean/dressing without leakage

## 2025-01-17 NOTE — ADDENDUM
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   - Chief Complaint (CC): Follow-up for squamous cell carcinoma of the left basal tongue after completing chemoradiotherapy. 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 He is under the care of Dr. Smith (medical oncologist) and Dr. Villalpando (radiation oncologist). The patient completed radiation and chemo therapies end of August. He experienced significant weight loss during the treatment, dropping from 210 pounds to around 150 pounds, but his weight has since stabilized. The patient is currently unable to consume solid foods and has a gastrostomy tube in place, which was inserted before the third chemotherapy session. He can tolerate liquids orally but relies on the feeding tube for nutritional support. The patient is currently receiving speech therapy to address swallowing difficulties and is being monitored by the nutritionist.   - Past Medical History:     - Squamous cell carcinoma of the left basal tongue (T4 N2 stage)     - Significant weight loss during cancer treatment     - Gastrostomy tube placement   - Past Surgical History:     - Gastrostomy tube placement   12/9/2024: fu one week, stage IV OPSCCA, with questionable persistence, bleeding episodse 2 weeks ago, has not obatined CTA, scheduled for next week  1/17/25: CTA reviewed with no evidence of vascular encroachment, progression of disease comparing CTA and PET. recent episode of bleeding, patients son recounts about a 1/8 of a cup   Objective:   - Diagnostic Results: A PET scan has been ordered for November 19th (or November 23rd, as per the patient's recollection) to assess the response to treatment and determine if any residual cancer is present.   - Physical Examination (PE): On physical examination, the patient appears to be in good condition for his age and the extensive treatment he has undergone. No palpable lymph nodes were noted in the neck region. Intraoral examination revealed some edema on the side of the tongue, but no visible tumor mass or radiation mucositis was observed. A small area of candidiasis was noted on the side of the mouth has resolved. The tongue felt soft, and no fibrosis was evident.    Assessment and Plan:   - Squamous Cell Carcinoma of the Left Basal Tongue (T4 N2), persistence of disease, with local regional disease, no evidence of airway embarrassment, discussed expectant management of bleeding given it is self limiting. discussed possibiilty of recurrent bleeding  continue IT FU 6 weeks No surgical options at this point palliative care